# Patient Record
Sex: FEMALE
[De-identification: names, ages, dates, MRNs, and addresses within clinical notes are randomized per-mention and may not be internally consistent; named-entity substitution may affect disease eponyms.]

---

## 2018-03-30 ENCOUNTER — HOSPITAL ENCOUNTER (EMERGENCY)
Dept: HOSPITAL 14 - H.ER | Age: 28
Discharge: HOME | End: 2018-03-30
Payer: COMMERCIAL

## 2018-03-30 VITALS
SYSTOLIC BLOOD PRESSURE: 127 MMHG | OXYGEN SATURATION: 100 % | DIASTOLIC BLOOD PRESSURE: 78 MMHG | HEART RATE: 95 BPM | RESPIRATION RATE: 16 BRPM | TEMPERATURE: 97.8 F
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
## 2018-03-30 DIAGNOSIS — S93.401A: Primary | ICD-10-CM

## 2018-03-30 NOTE — ED PDOC
Lower Extremity Pain/Injury


Time Seen by Provider: 03/30/18 12:24


Chief Complaint (Nursing): Lower Extremity Problem/Injury


History Per: Patient


History/Exam Limitations: no limitations


Onset/Duration Of Symptoms: Hrs (x 3)


Current Symptoms Are (Timing): Still Present


Additional Complaint(s): 





Ms. Garcia is a 27 year old female who presents to the ED complaining of right 

ankle pain, onset 11:00. Patient reports she twisted her right ankle [place] 

[place]. Patient reports pain, but no numbness or diffuse 

range of motion. Patient was offered medication for pain, but declined.











PMD: Provider KOSTAS











Past Medical History


Reviewed: Historical Data, Nursing Documentation, Vital Signs


Vital Signs: 





 Last Vital Signs











Temp  97.8 F   03/30/18 12:17


 


Pulse  95 H  03/30/18 12:17


 


Resp  16   03/30/18 12:17


 


BP  127/78   03/30/18 12:17


 


Pulse Ox  100   03/30/18 12:17














- Medical History


PMH: No Chronic Diseases





- Family History


Family History: States: Unknown Family Hx





- Social History


Current smoker - smoking cessation education provided: No


Alcohol: None


Drugs: Denies





- Allergies


Allergies/Adverse Reactions: 


 Allergies











Allergy/AdvReac Type Severity Reaction Status Date / Time


 


No Known Allergies Allergy   Verified 03/30/18 12:17














Review of Systems


ROS Statement: Except As Marked, All Systems Reviewed And Found Negative


Musculoskeletal: Negative for: Other (diffuse range of motion)


Neurological: Negative for: Numbness





Physical Exam





- Reviewed


Nursing Documentation Reviewed: Yes


Vital Signs Reviewed: Yes





- Physical Exam


Comments: 





GENERAL APPEARANCE: Patient is awake, alert, oriented x 3, in no acute distress.


SKIN: Warm, dry; (-) cyanosis.


LOWER EXTREMITY: Ankle: (+) mild swelling, tenderness of the medial aspect of 

the right ankle; (+) mild swelling of the lateral right ankle; (+) Achilles 

tendon intact and nontender. (-) dislocations


CARDIOVASCULAR: (+) distal pulse.


NEUROLOGIC: (+) distal sensation.





- ECG


O2 Sat by Pulse Oximetry: 100 (RA)


Pulse Ox Interpretation: Normal





Medical Decision Making


Medical Decision Making: 





Time: 12:32


Plan:


- Right Ankle X-Ray











12:42


Patient is declining X-Ray.


Bolanos wrap applied. 


Patient is refusing crutches.





Patient was advised to ice and elevate ankle. Advised to follow up with primary 

care physician in 1-2 days without fail. Return to the emergency room at any 

time for any new or worsening symptoms.








Patient states she fully agrees with and understands discharge instructions. 

States that she agrees with the plan and disposition. Verbalized and repeated 

discharge instructions and plan. I have given the patient opportunity to ask 

any additional questions.











--------------------------------------------------------------------------------

-----------------


Scribe Attestation:


Documented by Malvin Colvin, acting as a scribe for Nafisa San PA-C.





Provider Scribe Attestation:


All medical record entries made by the Scribe were at my direction and 

personally dictated by me. I have reviewed the chart and agree that the record 

accurately reflects my personal performance of the history, physical exam, 

medical decision making, and the department course for this patient. I have 

also personally directed, reviewed, and agree with the discharge instructions 

and disposition.








Disposition





- Clinical Impression


Clinical Impression: 


 Ankle sprain








- Patient ED Disposition


Is Patient to be Admitted: No


Counseled Patient/Family Regarding: Need For Followup





- Disposition


Disposition: Routine/Home


Disposition Time: 12:56


Condition: STABLE


Instructions:  Ankle Sprain (DC)


Forms:  [facility] (English)





- PA / NP / Resident Statement


MD/ has reviewed & agrees with the documentation as recorded.